# Patient Record
Sex: FEMALE | Race: WHITE | NOT HISPANIC OR LATINO | Employment: STUDENT | URBAN - NONMETROPOLITAN AREA
[De-identification: names, ages, dates, MRNs, and addresses within clinical notes are randomized per-mention and may not be internally consistent; named-entity substitution may affect disease eponyms.]

---

## 2024-02-22 ENCOUNTER — HOSPITAL ENCOUNTER (EMERGENCY)
Facility: HOSPITAL | Age: 19
Discharge: HOME | End: 2024-02-22
Attending: EMERGENCY MEDICINE
Payer: COMMERCIAL

## 2024-02-22 VITALS
HEIGHT: 68 IN | DIASTOLIC BLOOD PRESSURE: 91 MMHG | HEART RATE: 118 BPM | WEIGHT: 145 LBS | SYSTOLIC BLOOD PRESSURE: 139 MMHG | TEMPERATURE: 98 F | BODY MASS INDEX: 21.98 KG/M2 | RESPIRATION RATE: 16 BRPM | OXYGEN SATURATION: 98 %

## 2024-02-22 DIAGNOSIS — J98.8 VIRAL RESPIRATORY ILLNESS: Primary | ICD-10-CM

## 2024-02-22 DIAGNOSIS — B97.89 VIRAL RESPIRATORY ILLNESS: Primary | ICD-10-CM

## 2024-02-22 LAB
FLUAV SUBTYP SPEC NAA+PROBE: NOT DETECTED
FLUBV RNA ISLT QL NAA+PROBE: NOT DETECTED
S PYO AG THROAT QL: NEGATIVE
SARS-COV-2 RNA RESP QL NAA+PROBE: NOT DETECTED

## 2024-02-22 PROCEDURE — 87880 STREP A ASSAY W/OPTIC: CPT

## 2024-02-22 PROCEDURE — 87636 SARSCOV2 & INF A&B AMP PRB: CPT | Performed by: NURSE PRACTITIONER

## 2024-02-22 PROCEDURE — 99283 EMERGENCY DEPT VISIT LOW MDM: CPT

## 2024-02-22 PROCEDURE — 87081 CULTURE SCREEN ONLY: CPT

## 2024-02-22 NOTE — ED PROVIDER NOTES
Pt Name: Ashley Hernandes  MRN: [id number]  : 2005  Date of Encounter: 2024    PCP: Provider, No Known      Subjective    History of Present Illness:    Chief Complaint: Cough, congestion, vomiting    History of Present Illness: Ashley Hernandes is a 18 y.o. female who presents to the ER complaining of cough and congestion that is been ongoing for 4 to 5 days.  Patient states she had 1 episode of vomiting this morning.  Patient denies shortness of breath nausea diarrhea muscle aches.        Nurses Notes reviewed and agree, including vitals, allergies, social history and prior medical history.       Allergies:    Patient has no known allergies.    History reviewed. No pertinent past medical history.    No past surgical history on file.    Social History     Socioeconomic History    Marital status: Single       History reviewed. No pertinent family history.    REVIEW OF SYSTEMS:     All systems reviewed and not pertinent unless noted.    Review of Systems   Constitutional:  Positive for fatigue.   HENT:  Positive for congestion.    Respiratory:  Positive for cough.    Gastrointestinal:  Positive for vomiting.   Musculoskeletal:  Positive for myalgias.   All other systems reviewed and are negative.      Objective    Physical Exam  Vitals and nursing note reviewed.   Constitutional:       Appearance: Normal appearance.   HENT:      Head: Normocephalic and atraumatic.   Eyes:      Extraocular Movements: Extraocular movements intact.      Pupils: Pupils are equal, round, and reactive to light.   Cardiovascular:      Rate and Rhythm: Normal rate and regular rhythm.      Pulses: Normal pulses.      Heart sounds: Normal heart sounds.   Pulmonary:      Effort: Pulmonary effort is normal.      Breath sounds: Normal breath sounds.   Abdominal:      General: Abdomen is flat. Bowel sounds are normal.      Palpations: Abdomen is soft.   Musculoskeletal:      Cervical back: Normal range of motion and neck supple.    Skin:     Capillary Refill: Capillary refill takes less than 2 seconds.   Neurological:      General: No focal deficit present.      Mental Status: She is alert and oriented to person, place, and time. Mental status is at baseline.      GCS: GCS eye subscore is 4. GCS verbal subscore is 5. GCS motor subscore is 6.      Sensory: Sensation is intact.      Motor: Motor function is intact.      Gait: Gait is intact.   Psychiatric:         Attention and Perception: Attention and perception normal.         Mood and Affect: Mood and affect normal.         Speech: Speech normal.         Behavior: Behavior normal. Behavior is cooperative.                          Procedures    ED Course:         LAB Results:    Lab Results (last 24 hours)       Procedure Component Value Units Date/Time    COVID PRE-OP / PRE-PROCEDURE SCREENING ORDER (NO ISOLATION) - Swab, Nasopharynx [[id number]]  (Normal) Collected: 02/22/24 1123    Specimen: Swab from Nasopharynx Updated: 02/22/24 1151    Narrative:      The following orders were created for panel order COVID PRE-OP / PRE-PROCEDURE SCREENING ORDER (NO ISOLATION) - Swab, Nasopharynx.  Procedure                               Abnormality         Status                     ---------                               -----------         ------                     COVID-19 and FLU A/B PCR...[[id number]]  Normal              Final result                 Please view results for these tests on the individual orders.    COVID-19 and FLU A/B PCR, 1 HR TAT - Swab, Nasopharynx [[id number]]  (Normal) Collected: 02/22/24 1123    Specimen: Swab from Nasopharynx Updated: 02/22/24 1151     COVID19 Not Detected     Influenza A PCR Not Detected     Influenza B PCR Not Detected    Narrative:      Fact sheet for providers: https://www.fda.gov/media/491493/download    Fact sheet for patients: https://www.fda.gov/media/106230/download    Test performed by PCR.    Rapid Strep A Screen - Swab, Throat [[id number]]   (Normal) Collected: 02/22/24 1125    Specimen: Swab from Throat Updated: 02/22/24 1200     Strep A Ag Negative    Beta Strep Culture, Throat - Swab, Throat [[id number]] Collected: 02/22/24 1125    Specimen: Swab from Throat Updated: 02/22/24 1200             If labs were ordered, I have independently reviewed the results and considered them in the diagnosis and treatment plan for the patient    RADIOLOGY    No radiology results from the last 24 hrs     If I have ordered, I have independently reviewed the above noted radiographic studies.  Please see the radiologist dictation for the official interpretation    Medications given to patient in the ER    Medications - No data to display        I have discussed all the test results with patient and available family and the plan for disposition is discharged home and request patient follow-up with primary care provider within the next 7 days for reevaluation.      Medical Decision Making   Ashley Hernandes is a 18 y.o. female who presents to the ER complaining of cough and congestion that is been ongoing for 4 to 5 days.  Patient states she had 1 episode of vomiting this morning.  Patient denies shortness of breath nausea diarrhea muscle aches.    DDX: includes but is not limited to: COVID-19, influenza, viral respiratory illness, viral illness, strep pharyngitis, other    Problems Addressed:  Viral respiratory illness: acute illness or injury    Amount and/or Complexity of Data Reviewed  External Data Reviewed:      Details: I have personally reviewed labs, radiology EKG and notes from patient's chart  Labs: ordered. Decision-making details documented in ED Course.     Details: I have personally reviewed and documented all results  Discussion of management or test interpretation with external provider(s): Discussed assessment, treatment and plan with ER attending    Risk  Risk Details: I have discussed with patient the finding of the test preformed today. Patient has been  diagnosed with viral respiratory illness and will be discharged home.  Patient requested to follow-up with primary care provider within the next 7 days for reevaluation. Strict return precautions have been given and patient verbalizes understanding          Final diagnoses:   Viral respiratory illness         Please note that portions of this document were completed using voice recognition dictation software.       Fernando Ward, APRN  02/22/24 8212

## 2024-02-24 LAB — BACTERIA SPEC AEROBE CULT: NORMAL
